# Patient Record
Sex: MALE | Race: OTHER | HISPANIC OR LATINO | URBAN - METROPOLITAN AREA
[De-identification: names, ages, dates, MRNs, and addresses within clinical notes are randomized per-mention and may not be internally consistent; named-entity substitution may affect disease eponyms.]

---

## 2023-08-24 ENCOUNTER — EMERGENCY (EMERGENCY)
Facility: HOSPITAL | Age: 43
LOS: 1 days | Discharge: HOME | End: 2023-08-24
Attending: EMERGENCY MEDICINE
Payer: COMMERCIAL

## 2023-08-24 VITALS
TEMPERATURE: 99 F | HEART RATE: 84 BPM | HEIGHT: 78 IN | OXYGEN SATURATION: 96 % | DIASTOLIC BLOOD PRESSURE: 87 MMHG | WEIGHT: 309.97 LBS | SYSTOLIC BLOOD PRESSURE: 143 MMHG | RESPIRATION RATE: 18 BRPM

## 2023-08-24 PROCEDURE — 99283 EMERGENCY DEPT VISIT LOW MDM: CPT

## 2023-08-24 PROCEDURE — 99282 EMERGENCY DEPT VISIT SF MDM: CPT

## 2023-08-24 NOTE — ED ADULT TRIAGE NOTE - CHIEF COMPLAINT QUOTE
Pt stated "I think I have nerve damage in my left arm "after having blood drawn on Monday  at [facility] office noticed bruises at site c/o pain and limited movement of left arm and left hand feels tingling and numb.

## 2023-08-25 NOTE — ED PROVIDER NOTE - OBJECTIVE STATEMENT
42 yr old male with hx of Dm presents to ed c/o left shoulder pain and difficulty moving it after having blood drawn at the antecubital vein 3 days ago. pt developed a small hematoma. denies any neck pain, headache, trauma, sudden snapping or overstretching of arm. pt can't lift arm above shoulder or lift heavy objects. feels like it is numb.

## 2023-08-25 NOTE — ED PROVIDER NOTE - CLINICAL SUMMARY MEDICAL DECISION MAKING FREE TEXT BOX
42 yr old male with hx of Dm presents to ed c/o left shoulder pain and difficulty moving it after having blood drawn at the antecubital vein 3 days ago. pt developed a small hematoma. denies any neck pain, headache, trauma, sudden snapping or overstretching of arm. pt can't lift arm above shoulder or lift heavy objects. feels like it is numb.    peripheral nerve injury - unsure if due to blood draw but no sign suggesting a cva incident or traumatic event. pt otherwise is vascularly intact. no sign of compartment syndrome or deformity. advise to use warm compress, supportive elbow brace, motrin and follow up with neuro if worsens.

## 2023-08-25 NOTE — ED PROVIDER NOTE - PATIENT PORTAL LINK FT
You can access the FollowMyHealth Patient Portal offered by [facility] by registering at the following website: [email or web address]. By joining [employer]’s FollowMyHealth portal, you will also be able to view your health information using other applications (apps) compatible with our system.

## 2023-08-25 NOTE — ED PROVIDER NOTE - PHYSICAL EXAMINATION
left radial pulse intact. able to supinate/pronate, flex and extend at elbow. no swelling or deformity. minimal hematoma at medial antecubital. able to move shoulder w/o issues but limited when attempting to move arm past shoulder.

## 2023-08-25 NOTE — ED ADULT NURSE NOTE - OBJECTIVE STATEMENT
Pt stated "I think I have nerve damage in my left arm "after having blood drawn on Monday  at [facility] office noticed bruises at site c/o pain and limited movement of left arm and left hand feels tingling and numb. AO4, ambulatory

## 2025-06-17 ENCOUNTER — EMERGENCY (EMERGENCY)
Facility: HOSPITAL | Age: 45
LOS: 1 days | End: 2025-06-17
Attending: STUDENT IN AN ORGANIZED HEALTH CARE EDUCATION/TRAINING PROGRAM
Payer: COMMERCIAL

## 2025-06-17 VITALS
OXYGEN SATURATION: 96 % | DIASTOLIC BLOOD PRESSURE: 89 MMHG | TEMPERATURE: 98 F | HEIGHT: 73 IN | HEART RATE: 89 BPM | RESPIRATION RATE: 16 BRPM | WEIGHT: 290.13 LBS | SYSTOLIC BLOOD PRESSURE: 131 MMHG

## 2025-06-17 VITALS
RESPIRATION RATE: 17 BRPM | OXYGEN SATURATION: 96 % | HEART RATE: 72 BPM | SYSTOLIC BLOOD PRESSURE: 162 MMHG | TEMPERATURE: 98 F | DIASTOLIC BLOOD PRESSURE: 82 MMHG

## 2025-06-17 PROCEDURE — 72192 CT PELVIS W/O DYE: CPT

## 2025-06-17 PROCEDURE — 99284 EMERGENCY DEPT VISIT MOD MDM: CPT

## 2025-06-17 PROCEDURE — 99284 EMERGENCY DEPT VISIT MOD MDM: CPT | Mod: 25

## 2025-06-17 PROCEDURE — 72192 CT PELVIS W/O DYE: CPT | Mod: 26

## 2025-06-17 NOTE — ED PROVIDER NOTE - NSFOLLOWUPINSTRUCTIONS_ED_ALL_ED_FT
Puncture Wound    WHAT YOU NEED TO KNOW:    A puncture wound is a hole in the skin made by a sharp, pointed object. The area may be bruised or swollen. You may have bleeding, pain, or trouble moving the affected area.  Puncture Wound    DISCHARGE INSTRUCTIONS:    Return to the emergency department if:    You have severe pain.    You have numbness or tingling in the area of your wound.    Your wound starts bleeding and does not stop, even after you apply pressure.  Call your doctor if:    You have new drainage or a bad odor coming from the wound.    You have a fever or chills.    You have increased swelling, redness, or pain.    You have red streaks on your skin coming from your wound.    You have questions or concerns about your condition or care.  Medicines: You may need any of the following:    NSAIDs, such as ibuprofen, help decrease swelling, pain, and fever. This medicine is available with or without a doctor's order. NSAIDs can cause stomach bleeding or kidney problems in certain people. If you take blood thinner medicine, always ask your healthcare provider if NSAIDs are safe for you. Always read the medicine label and follow directions.    Antibiotics help prevent a bacterial infection.    Take your medicine as directed. Contact your healthcare provider if you think your medicine is not helping or if you have side effects. Tell your provider if you are allergic to any medicine. Keep a list of the medicines, vitamins, and herbs you take. Include the amounts, and when and why you take them. Bring the list or the pill bottles to follow-up visits. Carry your medicine list with you in case of an emergency.  Care for your wound as directed: Keep your wound clean and dry. When you are allowed to bathe, carefully wash the wound with soap and water. Dry the area and put on new, clean bandages as directed. Change your bandages when they get wet or dirty.    Rest and elevate the injured area above the level of your heart as often as you can. This will help decrease swelling and pain. Prop your injured area on pillows or blankets to keep it elevated comfortably.    Follow up with your doctor in 2 to 3 days: Write down your questions so you remember to ask them during your visits.

## 2025-06-17 NOTE — ED PROVIDER NOTE - OBJECTIVE STATEMENT
Patient is a 43 yo male with PMHx NIDDM, asthma presenting with L buttock puncture wound. Patient states his testosterone is low and patient receives IM  testosterone injections.  Patient wife injected into his left buttock prior to arrival and thinks she may have lost the needle  Since there was no longer a needle attached to the injector. Patient denies any buttock pain and is able to sit.  Patient initially had some left buttock and left thigh pain which radiated to the groin however currently asymptomatic.  Denies any other past medical history.

## 2025-06-17 NOTE — ED ADULT NURSE NOTE - OBJECTIVE STATEMENT
pt reports wife was giving his testosterone shot and needle got stuck in his left buttock. upon assessment, no needle noted. no active bleeding noted. pt reports discomfort and numbness to left buttock and left thigh

## 2025-06-17 NOTE — ED ADULT TRIAGE NOTE - CHIEF COMPLAINT QUOTE
L buttocks puncture + numbness radiating to L thigh 30 rainer ago "My wife was administering testosterone injection to my L buttocks when the needle got stuck inside" PMH DM, Asthma

## 2025-06-17 NOTE — ED PROVIDER NOTE - PATIENT PORTAL LINK FT
You can access the FollowMyHealth Patient Portal offered by [facility] by registering at the following website: [email or web address]. By joining [employer]’s FollowMyHealth portal, you will also be able to view your health information using other applications (apps) compatible with our system.

## 2025-06-17 NOTE — ED ADULT NURSE NOTE - NSFALLUNIVINTERV_ED_ALL_ED
Bed/Stretcher in lowest position, wheels locked, appropriate side rails in place/Call bell, personal items and telephone in reach/Instruct patient to call for assistance before getting out of bed/chair/stretcher/Non-slip footwear applied when patient is off stretcher/[place] to call system/Physically safe environment - no spills, clutter or unnecessary equipment/Purposeful proactive rounding/Room/bathroom lighting operational, light cord in reach

## 2025-06-17 NOTE — ED PROVIDER NOTE - CLINICAL SUMMARY MEDICAL DECISION MAKING FREE TEXT BOX
Patient is a 43 yo male with PMHx NIDDM, asthma presenting with L buttock puncture wound. Patient states his testosterone is low and patient receives IM  testosterone injections.  Patient wife injected into his left buttock prior to arrival and thinks she may have lost the needle  Since there was no longer a needle attached to the injector. Patient denies any buttock pain and is able to sit.  VSS  small punctate left buttock puncture wound, no induration no fluctuance no active bleeding or drainage no obvious foreign body nontender no surrounding erythema or warmth.  -  patient offered wound exploration but prefers to obtain imaging. shared decision making conversation held. Will obtain CT, reassess.

## 2025-06-17 NOTE — ED PROVIDER NOTE - PHYSICAL EXAMINATION
Gen: no acute distress  Head: normocephalic, atraumatic  Lung: CTAB, no respiratory distress, no wheezing, rales, rhonchi  CV: normal s1/s2, rrr,   Abd: soft, non-tender, non-distended  MSK: full range of motion in all 4 extremities  Neuro: No focal neurologic deficits  Skin:  small punctate left buttock puncture wound, no induration no fluctuance no active bleeding or drainage no obvious foreign body nontender no surrounding erythema or warmth

## 2025-06-17 NOTE — ED ADULT TRIAGE NOTE - BP NONINVASIVE DIASTOLIC (MM HG)
Mila 89